# Patient Record
Sex: MALE | Race: WHITE | URBAN - METROPOLITAN AREA
[De-identification: names, ages, dates, MRNs, and addresses within clinical notes are randomized per-mention and may not be internally consistent; named-entity substitution may affect disease eponyms.]

---

## 2018-01-26 ENCOUNTER — HOSPITAL ENCOUNTER (OUTPATIENT)
Dept: OTHER | Age: 28
Discharge: HOME | End: 2018-01-26

## 2018-01-26 DIAGNOSIS — R07.9 CHEST PAIN, UNSPECIFIED TYPE: ICD-10-CM

## 2022-12-28 ENCOUNTER — OFFICE VISIT (OUTPATIENT)
Dept: URGENT CARE | Age: 32
End: 2022-12-28

## 2022-12-28 VITALS
TEMPERATURE: 98.9 F | DIASTOLIC BLOOD PRESSURE: 93 MMHG | BODY MASS INDEX: 33.43 KG/M2 | SYSTOLIC BLOOD PRESSURE: 138 MMHG | HEIGHT: 67 IN | WEIGHT: 213 LBS | HEART RATE: 131 BPM | OXYGEN SATURATION: 97 % | RESPIRATION RATE: 20 BRPM

## 2022-12-28 DIAGNOSIS — R03.0 ELEVATED BP WITHOUT DIAGNOSIS OF HYPERTENSION: ICD-10-CM

## 2022-12-28 DIAGNOSIS — J45.41 MODERATE PERSISTENT ASTHMA WITH ACUTE EXACERBATION: Primary | ICD-10-CM

## 2022-12-28 LAB
INFLUENZA VIRUS A RNA: NEGATIVE
INFLUENZA VIRUS B RNA: NEGATIVE

## 2022-12-28 RX ORDER — ALBUTEROL SULFATE 90 UG/1
2 AEROSOL, METERED RESPIRATORY (INHALATION) 4 TIMES DAILY PRN
Qty: 54 G | Refills: 0 | Status: SHIPPED | OUTPATIENT
Start: 2022-12-28

## 2022-12-28 RX ORDER — ALBUTEROL SULFATE 90 UG/1
AEROSOL, METERED RESPIRATORY (INHALATION)
COMMUNITY
Start: 2021-04-08

## 2022-12-28 RX ORDER — IPRATROPIUM BROMIDE AND ALBUTEROL SULFATE 2.5; .5 MG/3ML; MG/3ML
1 SOLUTION RESPIRATORY (INHALATION) ONCE
Status: COMPLETED | OUTPATIENT
Start: 2022-12-28 | End: 2022-12-28

## 2022-12-28 RX ORDER — FLUTICASONE PROPIONATE AND SALMETEROL 100; 50 UG/1; UG/1
1 POWDER RESPIRATORY (INHALATION) EVERY 12 HOURS
Qty: 1 EACH | Refills: 0 | Status: SHIPPED | OUTPATIENT
Start: 2022-12-28

## 2022-12-28 RX ORDER — PREDNISONE 20 MG/1
40 TABLET ORAL DAILY
Qty: 10 TABLET | Refills: 0 | Status: SHIPPED | OUTPATIENT
Start: 2022-12-28 | End: 2023-01-02

## 2022-12-28 RX ADMIN — IPRATROPIUM BROMIDE AND ALBUTEROL SULFATE 1 AMPULE: 2.5; .5 SOLUTION RESPIRATORY (INHALATION) at 12:15

## 2022-12-28 RX ADMIN — IPRATROPIUM BROMIDE AND ALBUTEROL SULFATE 1 AMPULE: 2.5; .5 SOLUTION RESPIRATORY (INHALATION) at 11:44

## 2022-12-28 ASSESSMENT — ENCOUNTER SYMPTOMS
SORE THROAT: 1
COUGH: 1
CHEST TIGHTNESS: 1
VOMITING: 0
NAUSEA: 1
DIARRHEA: 0
SHORTNESS OF BREATH: 1
WHEEZING: 1

## 2022-12-28 NOTE — PATIENT INSTRUCTIONS
Duoneb HHN given at 11:50am and at 12:15pm  in clinic today   Influenza A&B negative  today in clinic  Monitor BP at home and call PCP if persistently elevated. ER evaluation if symptoms persist or if symptoms worsen. New Prescriptions    ALBUTEROL (PROVENTIL) (5 MG/ML) 0.5% NEBULIZER SOLUTION    Take 1 mL by nebulization 4 times daily as needed for Wheezing    ALBUTEROL SULFATE HFA (VENTOLIN HFA) 108 (90 BASE) MCG/ACT INHALER    Inhale 2 puffs into the lungs 4 times daily as needed for Wheezing    FLUTICASONE-SALMETEROL (ADVAIR) 100-50 MCG/ACT AEPB DISKUS INHALER    Inhale 1 puff into the lungs in the morning and 1 puff in the evening.     PREDNISONE (DELTASONE) 20 MG TABLET    Take 2 tablets by mouth daily for 5 days

## 2022-12-28 NOTE — PROGRESS NOTES
Digna Josue (:  1990) is a 29 y.o. male,New patient, here for evaluation of the following chief complaint(s):  Asthma (Exacerbation since last night, out of albuterol, COVID negative )      ASSESSMENT/PLAN:    ICD-10-CM    1. Moderate persistent asthma with acute exacerbation  J45.41 ipratropium-albuterol (DUONEB) nebulizer solution 1 ampule     ipratropium-albuterol (DUONEB) nebulizer solution 1 ampule     POCT Influenza A/B DNA (Alere i)      2. Elevated BP without diagnosis of hypertension  R03.0           Duoneb HHN given at 11:50am and at 12:15pm  in clinic today   Symptoms significantly improved after HHN  Influenza A&B negative  today in clinic  Monitor BP at home and call PCP if persistently elevated. ER evaluation if symptoms persist or if symptoms worsen. SUBJECTIVE/OBJECTIVE:    History provided by:  Patient   used: No    HPI:   29 y.o. male presents with symptoms of cough, SOB, and wheezing ongoing since 2022. States he ran out of Symbicort months ago. Ran out of home albuterol MDI last night. Used a coworker's albuterol MDI this morning and got mild relief for 15 minutes. Denies fever. Denies known COVID or flu exposure  but also had body aches and chills over the past 24 hours. States COVID test this morning at work was negative. Vitals:    22 1126 22 1151 22 1239   BP: (!) 155/103 (!) 138/93    Site: Left Upper Arm     Position: Sitting     Cuff Size: Medium Adult     Pulse: (!) 130 (!) 125 (!) 131   Resp:    Temp: 98.9 °F (37.2 °C)     SpO2: 95% 97% 97%   Weight: 213 lb (96.6 kg)     Height: 5' 7\" (1.702 m)         Review of Systems   Constitutional:  Positive for chills and fatigue. Negative for diaphoresis and fever. HENT:  Positive for congestion and sore throat. Negative for ear pain. Respiratory:  Positive for cough, chest tightness, shortness of breath and wheezing. Gastrointestinal:  Positive for nausea.  Negative for diarrhea and vomiting. Physical Exam  Constitutional:       Appearance: Normal appearance. HENT:      Head: Normocephalic. Right Ear: Tympanic membrane, ear canal and external ear normal.      Left Ear: Tympanic membrane, ear canal and external ear normal.      Nose: Nose normal.      Mouth/Throat:      Mouth: Mucous membranes are moist.      Pharynx: Oropharynx is clear. Eyes:      Pupils: Pupils are equal, round, and reactive to light. Cardiovascular:      Rate and Rhythm: Regular rhythm. Tachycardia present. Pulses: Normal pulses. Pulmonary:      Effort: Tachypnea present. No accessory muscle usage, prolonged expiration, respiratory distress or retractions. Breath sounds: Decreased air movement present. No stridor. Examination of the right-upper field reveals wheezing. Examination of the left-upper field reveals wheezing. Examination of the right-middle field reveals wheezing. Examination of the left-middle field reveals wheezing. Examination of the right-lower field reveals wheezing. Examination of the left-lower field reveals wheezing. Wheezing and rhonchi present. No rales. Musculoskeletal:      Cervical back: Normal range of motion. Neurological:      Mental Status: He is alert. Psychiatric:         Mood and Affect: Mood normal.         Behavior: Behavior normal.         An electronic signature was used to authenticate this note.     --Maribel Lyman, KARINA - CNP

## 2022-12-28 NOTE — LETTER
[facility]  [id number] [address]  Phone: [phone]  Fax: [phone]    KARINA Anna CNP        December 28, 2022     Patient: Hanh Ford   YOB: 1990   Date of Visit: 12/28/2022       To Whom It May Concern: It is my medical opinion that Hanh Ford may return to work on 12/30/2022. If you have any questions or concerns, please don't hesitate to call.     Sincerely,        KARINA Anna CNP

## 2023-03-20 ENCOUNTER — OFFICE VISIT (OUTPATIENT)
Dept: URGENT CARE | Age: 33
End: 2023-03-20

## 2023-03-20 VITALS
RESPIRATION RATE: 16 BRPM | SYSTOLIC BLOOD PRESSURE: 117 MMHG | HEART RATE: 62 BPM | DIASTOLIC BLOOD PRESSURE: 80 MMHG | OXYGEN SATURATION: 98 % | TEMPERATURE: 98 F | WEIGHT: 213 LBS | BODY MASS INDEX: 33.43 KG/M2 | HEIGHT: 67 IN

## 2023-03-20 DIAGNOSIS — J45.31 ASTHMA IN ADULT, MILD PERSISTENT, WITH ACUTE EXACERBATION: Primary | ICD-10-CM

## 2023-03-20 RX ORDER — FEXOFENADINE HCL 180 MG/1
180 TABLET ORAL DAILY
Qty: 30 TABLET | Refills: 0 | Status: SHIPPED | OUTPATIENT
Start: 2023-03-20

## 2023-03-20 RX ORDER — MONTELUKAST SODIUM 10 MG/1
10 TABLET ORAL DAILY
Qty: 30 TABLET | Refills: 0 | Status: SHIPPED | OUTPATIENT
Start: 2023-03-20

## 2023-03-20 RX ORDER — PREDNISONE 20 MG/1
40 TABLET ORAL DAILY
Qty: 10 TABLET | Refills: 0 | Status: SHIPPED | OUTPATIENT
Start: 2023-03-20 | End: 2023-03-25

## 2023-03-20 RX ORDER — ALBUTEROL SULFATE 90 UG/1
2 AEROSOL, METERED RESPIRATORY (INHALATION) 4 TIMES DAILY PRN
Qty: 54 G | Refills: 0 | Status: SHIPPED | OUTPATIENT
Start: 2023-03-20

## 2023-03-20 RX ORDER — FLUTICASONE PROPIONATE AND SALMETEROL 100; 50 UG/1; UG/1
1 POWDER RESPIRATORY (INHALATION) EVERY 12 HOURS
Qty: 1 EACH | Refills: 0 | Status: SHIPPED | OUTPATIENT
Start: 2023-03-20

## 2023-03-20 ASSESSMENT — ENCOUNTER SYMPTOMS
WHEEZING: 1
COUGH: 1
RHINORRHEA: 1

## 2023-03-20 NOTE — PROGRESS NOTES
complains of cough and wheezing. Associated symptoms include rhinorrhea. His past medical history is significant for asthma. Medication Refill  Associated symptoms: cough, rhinorrhea and wheezing      Vitals:    03/20/23 1307   BP: 117/80   Pulse: 62   Resp: 16   Temp: 98 °F (36.7 °C)   SpO2: 98%   Weight: 213 lb (96.6 kg)   Height: 5' 7\" (1.702 m)       Review of Systems   HENT:  Positive for rhinorrhea. Respiratory:  Positive for cough and wheezing. All other systems reviewed and are negative. Physical Exam  Vitals reviewed. Constitutional:       Appearance: Normal appearance. HENT:      Head: Normocephalic and atraumatic. Right Ear: Tympanic membrane, ear canal and external ear normal.      Left Ear: Tympanic membrane, ear canal and external ear normal.      Nose: Rhinorrhea present. Rhinorrhea is clear. Mouth/Throat:      Mouth: Mucous membranes are moist.      Pharynx: Oropharynx is clear. No pharyngeal swelling, oropharyngeal exudate, posterior oropharyngeal erythema or uvula swelling. Eyes:      Pupils: Pupils are equal, round, and reactive to light. Cardiovascular:      Rate and Rhythm: Normal rate and regular rhythm. Pulses: Normal pulses. Heart sounds: Normal heart sounds. No murmur heard. No friction rub. No gallop. Pulmonary:      Effort: Pulmonary effort is normal.      Breath sounds: Examination of the right-upper field reveals wheezing. Examination of the left-upper field reveals wheezing. Examination of the right-middle field reveals wheezing. Examination of the left-middle field reveals wheezing. Examination of the right-lower field reveals wheezing. Examination of the left-lower field reveals wheezing. Wheezing present. Skin:     General: Skin is warm and dry. Neurological:      Mental Status: He is alert and oriented to person, place, and time. An electronic signature was used to authenticate this note.     --Radha Grimm, KARINA - CNP